# Patient Record
Sex: MALE | NOT HISPANIC OR LATINO | Employment: FULL TIME | URBAN - NONMETROPOLITAN AREA
[De-identification: names, ages, dates, MRNs, and addresses within clinical notes are randomized per-mention and may not be internally consistent; named-entity substitution may affect disease eponyms.]

---

## 2021-01-13 ENCOUNTER — IMMUNIZATION (OUTPATIENT)
Dept: VACCINE CLINIC | Facility: HOSPITAL | Age: 41
End: 2021-01-13

## 2021-01-13 PROCEDURE — 0001A: CPT | Performed by: FAMILY MEDICINE

## 2021-01-13 PROCEDURE — 91300 HC SARSCOV02 VAC 30MCG/0.3ML IM: CPT | Performed by: FAMILY MEDICINE

## 2021-02-03 ENCOUNTER — IMMUNIZATION (OUTPATIENT)
Dept: VACCINE CLINIC | Facility: HOSPITAL | Age: 41
End: 2021-02-03

## 2021-02-03 PROCEDURE — 91300 HC SARSCOV02 VAC 30MCG/0.3ML IM: CPT | Performed by: INTERNAL MEDICINE

## 2021-02-03 PROCEDURE — 0002A: CPT | Performed by: INTERNAL MEDICINE

## 2021-09-01 ENCOUNTER — APPOINTMENT (RX ONLY)
Dept: URBAN - METROPOLITAN AREA CLINIC 51 | Facility: CLINIC | Age: 41
Setting detail: DERMATOLOGY
End: 2021-09-01

## 2021-09-01 DIAGNOSIS — Z80.8 FAMILY HISTORY OF MALIGNANT NEOPLASM OF OTHER ORGANS OR SYSTEMS: ICD-10-CM

## 2021-09-01 DIAGNOSIS — D485 NEOPLASM OF UNCERTAIN BEHAVIOR OF SKIN: ICD-10-CM | Status: WORSENING

## 2021-09-01 PROBLEM — D48.5 NEOPLASM OF UNCERTAIN BEHAVIOR OF SKIN: Status: ACTIVE | Noted: 2021-09-01

## 2021-09-01 PROCEDURE — ? BIOPSY BY SHAVE METHOD

## 2021-09-01 PROCEDURE — ? FULL BODY SKIN EXAM - DECLINED

## 2021-09-01 PROCEDURE — ? COUNSELING

## 2021-09-01 PROCEDURE — 99202 OFFICE O/P NEW SF 15 MIN: CPT | Mod: 25

## 2021-09-01 PROCEDURE — 11102 TANGNTL BX SKIN SINGLE LES: CPT

## 2021-09-01 ASSESSMENT — LOCATION SIMPLE DESCRIPTION DERM: LOCATION SIMPLE: RIGHT UPPER ARM

## 2021-09-01 ASSESSMENT — LOCATION ZONE DERM: LOCATION ZONE: ARM

## 2021-09-01 ASSESSMENT — LOCATION DETAILED DESCRIPTION DERM: LOCATION DETAILED: RIGHT ANTERIOR PROXIMAL UPPER ARM

## 2021-09-01 NOTE — PROCEDURE: BIOPSY BY SHAVE METHOD
Detail Level: Detailed
Depth Of Biopsy: dermis
Was A Bandage Applied: Yes
Size Of Lesion In Cm: 1
Biopsy Type: H and E
Biopsy Method: Dermablade
Anesthesia Type: 1% lidocaine with epinephrine
Anesthesia Volume In Cc (Will Not Render If 0): 0.5
Additional Anesthesia Volume In Cc (Will Not Render If 0): 0
Hemostasis: Brittni's
Wound Care: Bacitracin
Dressing: bandage
Destruction After The Procedure: No
Type Of Destruction Used: Curettage
Curettage Text: The wound bed was treated with curettage after the biopsy was performed.
Cryotherapy Text: The wound bed was treated with cryotherapy after the biopsy was performed.
Electrodesiccation Text: The wound bed was treated with electrodesiccation after the biopsy was performed.
Electrodesiccation And Curettage Text: The wound bed was treated with electrodesiccation and curettage after the biopsy was performed.
Silver Nitrate Text: The wound bed was treated with silver nitrate after the biopsy was performed.
Lab: 6
Lab Facility: 3
Consent: Written consent was obtained and risks were reviewed including but not limited to scarring, infection, bleeding, scabbing, incomplete removal, nerve damage and allergy to anesthesia.
Post-Care Instructions: I reviewed with the patient in detail post-care instructions. Patient is to keep the biopsy site dry overnight, and then apply bacitracin twice daily until healed. Patient may apply hydrogen peroxide soaks to remove any crusting.
Notification Instructions: Patient will be notified of biopsy results. However, patient instructed to call the office if not contacted within 2 weeks.
Billing Type: Third-Party Bill
Information: Selecting Yes will display possible errors in your note based on the variables you have selected. This validation is only offered as a suggestion for you. PLEASE NOTE THAT THE VALIDATION TEXT WILL BE REMOVED WHEN YOU FINALIZE YOUR NOTE. IF YOU WANT TO FAX A PRELIMINARY NOTE YOU WILL NEED TO TOGGLE THIS TO 'NO' IF YOU DO NOT WANT IT IN YOUR FAXED NOTE.

## 2021-09-08 ENCOUNTER — APPOINTMENT (RX ONLY)
Dept: URBAN - METROPOLITAN AREA CLINIC 51 | Facility: CLINIC | Age: 41
Setting detail: DERMATOLOGY
End: 2021-09-08

## 2021-09-08 DIAGNOSIS — L81.4 OTHER MELANIN HYPERPIGMENTATION: ICD-10-CM | Status: STABLE

## 2021-09-08 DIAGNOSIS — D22 MELANOCYTIC NEVI: ICD-10-CM | Status: UNCHANGED

## 2021-09-08 PROBLEM — D22.5 MELANOCYTIC NEVI OF TRUNK: Status: ACTIVE | Noted: 2021-09-08

## 2021-09-08 PROCEDURE — ? TREATMENT REGIMEN

## 2021-09-08 PROCEDURE — 99213 OFFICE O/P EST LOW 20 MIN: CPT

## 2021-09-08 PROCEDURE — ? FULL BODY SKIN EXAM

## 2021-09-08 PROCEDURE — ? COUNSELING

## 2021-09-08 PROCEDURE — ? SUNSCREEN RECOMMENDATIONS

## 2021-09-08 ASSESSMENT — LOCATION DETAILED DESCRIPTION DERM
LOCATION DETAILED: RIGHT MEDIAL SUPERIOR CHEST
LOCATION DETAILED: EPIGASTRIC SKIN

## 2021-09-08 ASSESSMENT — LOCATION ZONE DERM: LOCATION ZONE: TRUNK

## 2021-09-08 ASSESSMENT — LOCATION SIMPLE DESCRIPTION DERM
LOCATION SIMPLE: ABDOMEN
LOCATION SIMPLE: CHEST

## 2023-10-24 ENCOUNTER — APPOINTMENT (OUTPATIENT)
Dept: CT IMAGING | Facility: HOSPITAL | Age: 43
End: 2023-10-24
Payer: MEDICAID

## 2023-10-24 ENCOUNTER — HOSPITAL ENCOUNTER (EMERGENCY)
Facility: HOSPITAL | Age: 43
Discharge: HOME | End: 2023-10-24
Attending: FAMILY MEDICINE | Admitting: FAMILY MEDICINE
Payer: MEDICAID

## 2023-10-24 VITALS
DIASTOLIC BLOOD PRESSURE: 94 MMHG | BODY MASS INDEX: 36.88 KG/M2 | WEIGHT: 235 LBS | TEMPERATURE: 98.5 F | SYSTOLIC BLOOD PRESSURE: 147 MMHG | HEIGHT: 67 IN | RESPIRATION RATE: 16 BRPM | HEART RATE: 97 BPM | OXYGEN SATURATION: 98 %

## 2023-10-24 DIAGNOSIS — N20.0 KIDNEY STONE: Primary | ICD-10-CM

## 2023-10-24 DIAGNOSIS — N13.9 OBSTRUCTIVE UROPATHY: ICD-10-CM

## 2023-10-24 LAB
ALBUMIN SERPL-MCNC: 5.3 G/DL (ref 3.5–5.2)
ALBUMIN/GLOB SERPL: 1.6 G/DL
ALP SERPL-CCNC: 102 U/L (ref 39–117)
ALT SERPL W P-5'-P-CCNC: 106 U/L (ref 1–41)
ANION GAP SERPL CALCULATED.3IONS-SCNC: 15.6 MMOL/L (ref 5–15)
AST SERPL-CCNC: 43 U/L (ref 1–40)
BACTERIA UR QL AUTO: ABNORMAL /HPF
BASOPHILS # BLD AUTO: 0.07 10*3/MM3 (ref 0–0.2)
BASOPHILS NFR BLD AUTO: 0.5 % (ref 0–1.5)
BILIRUB SERPL-MCNC: 0.7 MG/DL (ref 0–1.2)
BILIRUB UR QL STRIP: NEGATIVE
BUN SERPL-MCNC: 15 MG/DL (ref 6–20)
BUN/CREAT SERPL: 12.2 (ref 7–25)
CALCIUM SPEC-SCNC: 10.3 MG/DL (ref 8.6–10.5)
CHLORIDE SERPL-SCNC: 106 MMOL/L (ref 98–107)
CLARITY UR: CLEAR
CO2 SERPL-SCNC: 24.4 MMOL/L (ref 22–29)
COLOR UR: YELLOW
CREAT SERPL-MCNC: 1.23 MG/DL (ref 0.76–1.27)
CRP SERPL-MCNC: 0.47 MG/DL (ref 0–0.5)
DEPRECATED RDW RBC AUTO: 40 FL (ref 37–54)
EGFRCR SERPLBLD CKD-EPI 2021: 75.6 ML/MIN/1.73
EOSINOPHIL # BLD AUTO: 0.02 10*3/MM3 (ref 0–0.4)
EOSINOPHIL NFR BLD AUTO: 0.1 % (ref 0.3–6.2)
ERYTHROCYTE [DISTWIDTH] IN BLOOD BY AUTOMATED COUNT: 12.9 % (ref 12.3–15.4)
ERYTHROCYTE [SEDIMENTATION RATE] IN BLOOD: 9 MM/HR (ref 0–15)
GLOBULIN UR ELPH-MCNC: 3.4 GM/DL
GLUCOSE SERPL-MCNC: 133 MG/DL (ref 65–99)
GLUCOSE UR STRIP-MCNC: NEGATIVE MG/DL
HCT VFR BLD AUTO: 48 % (ref 37.5–51)
HGB BLD-MCNC: 16 G/DL (ref 13–17.7)
HGB UR QL STRIP.AUTO: ABNORMAL
HOLD SPECIMEN: NORMAL
HOLD SPECIMEN: NORMAL
HYALINE CASTS UR QL AUTO: ABNORMAL /LPF
IMM GRANULOCYTES # BLD AUTO: 0.04 10*3/MM3 (ref 0–0.05)
IMM GRANULOCYTES NFR BLD AUTO: 0.3 % (ref 0–0.5)
KETONES UR QL STRIP: NEGATIVE
LEUKOCYTE ESTERASE UR QL STRIP.AUTO: NEGATIVE
LYMPHOCYTES # BLD AUTO: 1.2 10*3/MM3 (ref 0.7–3.1)
LYMPHOCYTES NFR BLD AUTO: 8.6 % (ref 19.6–45.3)
MCH RBC QN AUTO: 28.6 PG (ref 26.6–33)
MCHC RBC AUTO-ENTMCNC: 33.3 G/DL (ref 31.5–35.7)
MCV RBC AUTO: 85.9 FL (ref 79–97)
MONOCYTES # BLD AUTO: 0.34 10*3/MM3 (ref 0.1–0.9)
MONOCYTES NFR BLD AUTO: 2.4 % (ref 5–12)
MUCOUS THREADS URNS QL MICRO: ABNORMAL /HPF
NEUTROPHILS NFR BLD AUTO: 12.31 10*3/MM3 (ref 1.7–7)
NEUTROPHILS NFR BLD AUTO: 88.1 % (ref 42.7–76)
NITRITE UR QL STRIP: NEGATIVE
NRBC BLD AUTO-RTO: 0 /100 WBC (ref 0–0.2)
PH UR STRIP.AUTO: 8 [PH] (ref 5–8)
PLATELET # BLD AUTO: 328 10*3/MM3 (ref 140–450)
PMV BLD AUTO: 9.8 FL (ref 6–12)
POTASSIUM SERPL-SCNC: 4.1 MMOL/L (ref 3.5–5.2)
PROT SERPL-MCNC: 8.7 G/DL (ref 6–8.5)
PROT UR QL STRIP: ABNORMAL
RBC # BLD AUTO: 5.59 10*6/MM3 (ref 4.14–5.8)
RBC # UR STRIP: ABNORMAL /HPF
REF LAB TEST METHOD: ABNORMAL
SODIUM SERPL-SCNC: 146 MMOL/L (ref 136–145)
SP GR UR STRIP: 1.02 (ref 1–1.03)
SQUAMOUS #/AREA URNS HPF: ABNORMAL /HPF
UROBILINOGEN UR QL STRIP: ABNORMAL
WBC # UR STRIP: ABNORMAL /HPF
WBC CLUMPS # UR AUTO: ABNORMAL /HPF
WBC NRBC COR # BLD: 13.98 10*3/MM3 (ref 3.4–10.8)
WHOLE BLOOD HOLD COAG: NORMAL
WHOLE BLOOD HOLD SPECIMEN: NORMAL

## 2023-10-24 PROCEDURE — 96374 THER/PROPH/DIAG INJ IV PUSH: CPT

## 2023-10-24 PROCEDURE — 81001 URINALYSIS AUTO W/SCOPE: CPT | Performed by: NURSE PRACTITIONER

## 2023-10-24 PROCEDURE — 80053 COMPREHEN METABOLIC PANEL: CPT | Performed by: NURSE PRACTITIONER

## 2023-10-24 PROCEDURE — 25010000002 ONDANSETRON PER 1 MG: Performed by: FAMILY MEDICINE

## 2023-10-24 PROCEDURE — 74176 CT ABD & PELVIS W/O CONTRAST: CPT | Performed by: RADIOLOGY

## 2023-10-24 PROCEDURE — 25810000003 LACTATED RINGERS SOLUTION: Performed by: FAMILY MEDICINE

## 2023-10-24 PROCEDURE — 25010000002 MORPHINE PER 10 MG: Performed by: FAMILY MEDICINE

## 2023-10-24 PROCEDURE — 51702 INSERT TEMP BLADDER CATH: CPT

## 2023-10-24 PROCEDURE — 85652 RBC SED RATE AUTOMATED: CPT | Performed by: NURSE PRACTITIONER

## 2023-10-24 PROCEDURE — 25010000002 HYDRALAZINE PER 20 MG: Performed by: FAMILY MEDICINE

## 2023-10-24 PROCEDURE — 36415 COLL VENOUS BLD VENIPUNCTURE: CPT

## 2023-10-24 PROCEDURE — 86140 C-REACTIVE PROTEIN: CPT | Performed by: NURSE PRACTITIONER

## 2023-10-24 PROCEDURE — 74176 CT ABD & PELVIS W/O CONTRAST: CPT

## 2023-10-24 PROCEDURE — 99284 EMERGENCY DEPT VISIT MOD MDM: CPT

## 2023-10-24 PROCEDURE — 25010000002 HYDROMORPHONE 1 MG/ML SOLUTION: Performed by: FAMILY MEDICINE

## 2023-10-24 PROCEDURE — 85025 COMPLETE CBC W/AUTO DIFF WBC: CPT | Performed by: NURSE PRACTITIONER

## 2023-10-24 PROCEDURE — 63710000001 ONDANSETRON ODT 4 MG TABLET DISPERSIBLE: Performed by: FAMILY MEDICINE

## 2023-10-24 PROCEDURE — 96375 TX/PRO/DX INJ NEW DRUG ADDON: CPT

## 2023-10-24 RX ORDER — SODIUM CHLORIDE 0.9 % (FLUSH) 0.9 %
10 SYRINGE (ML) INJECTION AS NEEDED
Status: DISCONTINUED | OUTPATIENT
Start: 2023-10-24 | End: 2023-10-25 | Stop reason: HOSPADM

## 2023-10-24 RX ORDER — CLONIDINE HYDROCHLORIDE 0.1 MG/1
0.1 TABLET ORAL ONCE
Status: DISCONTINUED | OUTPATIENT
Start: 2023-10-24 | End: 2023-10-24

## 2023-10-24 RX ORDER — ONDANSETRON 4 MG/1
4 TABLET, ORALLY DISINTEGRATING ORAL EVERY 8 HOURS PRN
Qty: 12 TABLET | Refills: 0 | Status: SHIPPED | OUTPATIENT
Start: 2023-10-24 | End: 2023-10-25 | Stop reason: SDUPTHER

## 2023-10-24 RX ORDER — TAMSULOSIN HYDROCHLORIDE 0.4 MG/1
2 CAPSULE ORAL DAILY
Qty: 10 CAPSULE | Refills: 0 | Status: SHIPPED | OUTPATIENT
Start: 2023-10-24 | End: 2023-10-25 | Stop reason: SDUPTHER

## 2023-10-24 RX ORDER — ONDANSETRON 2 MG/ML
4 INJECTION INTRAMUSCULAR; INTRAVENOUS ONCE
Status: COMPLETED | OUTPATIENT
Start: 2023-10-24 | End: 2023-10-24

## 2023-10-24 RX ORDER — HYDROCODONE BITARTRATE AND ACETAMINOPHEN 5; 325 MG/1; MG/1
1 TABLET ORAL ONCE
Status: COMPLETED | OUTPATIENT
Start: 2023-10-24 | End: 2023-10-24

## 2023-10-24 RX ORDER — HYDROCODONE BITARTRATE AND ACETAMINOPHEN 7.5; 325 MG/1; MG/1
1 TABLET ORAL EVERY 4 HOURS PRN
Qty: 20 TABLET | Refills: 0 | Status: SHIPPED | OUTPATIENT
Start: 2023-10-24

## 2023-10-24 RX ORDER — ONDANSETRON 4 MG/1
4 TABLET, ORALLY DISINTEGRATING ORAL ONCE
Status: DISCONTINUED | OUTPATIENT
Start: 2023-10-24 | End: 2023-10-24

## 2023-10-24 RX ORDER — HYDRALAZINE HYDROCHLORIDE 20 MG/ML
10 INJECTION INTRAMUSCULAR; INTRAVENOUS ONCE
Status: COMPLETED | OUTPATIENT
Start: 2023-10-24 | End: 2023-10-24

## 2023-10-24 RX ORDER — HYDROCODONE BITARTRATE AND ACETAMINOPHEN 5; 325 MG/1; MG/1
1 TABLET ORAL ONCE
Status: DISCONTINUED | OUTPATIENT
Start: 2023-10-24 | End: 2023-10-24

## 2023-10-24 RX ORDER — TAMSULOSIN HYDROCHLORIDE 0.4 MG/1
0.8 CAPSULE ORAL ONCE
Status: COMPLETED | OUTPATIENT
Start: 2023-10-24 | End: 2023-10-24

## 2023-10-24 RX ADMIN — SODIUM CHLORIDE, POTASSIUM CHLORIDE, SODIUM LACTATE AND CALCIUM CHLORIDE 1000 ML: 600; 310; 30; 20 INJECTION, SOLUTION INTRAVENOUS at 20:47

## 2023-10-24 RX ADMIN — MORPHINE SULFATE 4 MG: 4 INJECTION, SOLUTION INTRAMUSCULAR; INTRAVENOUS at 20:45

## 2023-10-24 RX ADMIN — HYDROCODONE BITARTRATE AND ACETAMINOPHEN 1 TABLET: 5; 325 TABLET ORAL at 22:13

## 2023-10-24 RX ADMIN — HYDRALAZINE HYDROCHLORIDE 10 MG: 20 INJECTION INTRAMUSCULAR; INTRAVENOUS at 20:44

## 2023-10-24 RX ADMIN — HYDROMORPHONE HYDROCHLORIDE 1 MG: 1 INJECTION, SOLUTION INTRAMUSCULAR; INTRAVENOUS; SUBCUTANEOUS at 21:03

## 2023-10-24 RX ADMIN — ONDANSETRON 4 MG: 2 INJECTION INTRAMUSCULAR; INTRAVENOUS at 20:32

## 2023-10-24 RX ADMIN — TAMSULOSIN HYDROCHLORIDE 0.8 MG: 0.4 CAPSULE ORAL at 22:13

## 2023-10-24 NOTE — ED PROVIDER NOTES
Subjective   History of Present Illness  Patient is a 42-year-old male presents the ED complaining of flank pain has been going on for the past week.  Positive for nausea.  Last void was at 10 AM.  Denies having any acute pathology with his kidneys in the past including kidney stones.    During my exam, patient seems very uncomfortable.  Work-up ordered by the PIT provider was back and I have notified the patient's results included the CT scan which noted right distal ureteral kidney stone with obstructive uropathy. RN doing bedside bladder scan. Darden ordered.       Review of Systems   Constitutional: Negative.  Negative for fatigue and fever.   HENT: Negative.  Negative for ear pain and sore throat.    Eyes: Negative.  Negative for pain and visual disturbance.   Respiratory: Negative.  Negative for cough and shortness of breath.    Cardiovascular: Negative.  Negative for chest pain and palpitations.   Gastrointestinal:  Positive for nausea. Negative for abdominal pain.   Endocrine: Negative.    Genitourinary:  Positive for decreased urine volume, difficulty urinating and flank pain. Negative for dysuria.   Musculoskeletal:  Negative for arthralgias.   Skin:  Negative for rash.   Allergic/Immunologic: Negative.    Neurological:  Negative for dizziness, weakness and headaches.   Psychiatric/Behavioral:  Negative for sleep disturbance.        No past medical history on file.    Allergies   Allergen Reactions    Penicillins Unknown - High Severity       No past surgical history on file.    No family history on file.    Social History     Socioeconomic History    Marital status:            Objective   Physical Exam  Vitals and nursing note reviewed.   Constitutional:       Appearance: He is ill-appearing.   HENT:      Head: Normocephalic and atraumatic.      Right Ear: External ear normal.      Left Ear: External ear normal.      Nose: Nose normal.      Mouth/Throat:      Mouth: Mucous membranes are moist.    Eyes:      Pupils: Pupils are equal, round, and reactive to light.   Cardiovascular:      Rate and Rhythm: Normal rate and regular rhythm.   Pulmonary:      Effort: Pulmonary effort is normal.      Breath sounds: Normal breath sounds.   Abdominal:      General: Abdomen is flat. Bowel sounds are normal.      Palpations: Abdomen is soft.   Musculoskeletal:         General: Normal range of motion.      Cervical back: Normal range of motion.   Skin:     General: Skin is warm.      Capillary Refill: Capillary refill takes less than 2 seconds.   Neurological:      Mental Status: He is alert and oriented to person, place, and time. Mental status is at baseline.   Psychiatric:         Mood and Affect: Mood normal.         Behavior: Behavior normal.         Thought Content: Thought content normal.         Judgment: Judgment normal.         Procedures           ED Course                                           Medical Decision Making  Differential diagnoses include kidney stones, acute cystitis, diverticulitis, enteritis, viral processes, colitis.    Patient is a 42-year-old male presents to the ED complaining of dysuria and unable to urinate for over 8 hours.  Physical exam shows a acutely distressed patient with suprapubic tenderness.  Work-up done in the ED shows 2.6 mm distal ureteral stone with obstructive uropathy.  After giving IV intervention and Darden, patient feels so much relieved and is not in any distress anymore and during my reassessment.  I discussed case treatment plan.  Shared decision making process with family and patient.  Patient is agreeable to go home with Darden catheter anchored to his leg until he sees urology.  He is to call them tomorrow to make an appointment for this.  All medications prescribed and advised to be taken as instructed.  He voiced understanding to all the above.  Upon being medically stable, patient was discharged home.  Advised to follow-up with PCP/urology for further care.   Warning signs to return to ED discussed with patient.  Patient's blood pressure upon discharge was 155/99.    Problems Addressed:  Kidney stone: complicated acute illness or injury  Obstructive uropathy: complicated acute illness or injury    Amount and/or Complexity of Data Reviewed  Labs: ordered. Decision-making details documented in ED Course.  Radiology: ordered. Decision-making details documented in ED Course.    Risk  Prescription drug management.        Final diagnoses:   Kidney stone   Obstructive uropathy       ED Disposition  ED Disposition       ED Disposition   Discharge    Condition   Stable    Comment   --               Britney Garza P, APRN  [address]   [address]  [place] [address]  [phone]    Schedule an appointment as soon as possible for a visit in 1 day  Close follow-up    Cedrick Harrison MD  [address]  [facility] [address]  [phone]    Schedule an appointment as soon as possible for a visit in 1 day  Close follow-up         Medication List        New Prescriptions      HYDROcodone-acetaminophen 7.5-325 MG per tablet  Commonly known as: NORCO  Take 1 tablet by mouth Every 4 (Four) Hours As Needed for Moderate Pain.     ondansetron ODT 4 MG disintegrating tablet  Commonly known as: ZOFRAN-ODT  Place 1 tablet on the tongue Every 8 (Eight) Hours As Needed for Nausea or Vomiting.     tamsulosin 0.4 MG capsule 24 hr capsule  Commonly known as: FLOMAX  Take 2 capsules by mouth Daily for 5 days.               Where to Get Your Medications        These medications were sent to [facility] - [place], [place] - [address] - [phone] [facility] [phone]   [address] [facility] [address]      Phone: [phone]   HYDROcodone-acetaminophen 7.5-325 MG per tablet  ondansetron ODT 4 MG disintegrating tablet  tamsulosin 0.4 MG capsule 24 hr capsule            Fay Carmona MD  10/24/23 9719

## 2023-10-24 NOTE — ED NOTES
MEDICAL SCREENING:    Reason for Visit: Flank Pain    Patient initially seen in triage.  The patient was advised further evaluation and diagnostic testing will be needed, some of the treatment and testing will be initiated in the lobby in order to begin the process.  The patient will be returned to the waiting area for the time being and possibly be re-assessed by a subsequent ED provider.  The patient will be brought back to the treatment area in as timely manner as possible.     Yulia Urbano, APRN  10/24/23 1726

## 2023-10-25 ENCOUNTER — OFFICE VISIT (OUTPATIENT)
Dept: UROLOGY | Facility: CLINIC | Age: 43
End: 2023-10-25
Payer: MEDICAID

## 2023-10-25 VITALS
DIASTOLIC BLOOD PRESSURE: 94 MMHG | HEIGHT: 67 IN | SYSTOLIC BLOOD PRESSURE: 146 MMHG | WEIGHT: 237.6 LBS | HEART RATE: 98 BPM | BODY MASS INDEX: 37.29 KG/M2

## 2023-10-25 DIAGNOSIS — R33.8 ACUTE URINARY RETENTION: ICD-10-CM

## 2023-10-25 DIAGNOSIS — N20.1 RIGHT URETERAL STONE: Primary | ICD-10-CM

## 2023-10-25 RX ORDER — ONDANSETRON 4 MG/1
4 TABLET, ORALLY DISINTEGRATING ORAL EVERY 8 HOURS PRN
Qty: 20 TABLET | Refills: 0 | Status: SHIPPED | OUTPATIENT
Start: 2023-10-25

## 2023-10-25 RX ORDER — TAMSULOSIN HYDROCHLORIDE 0.4 MG/1
1 CAPSULE ORAL DAILY
Qty: 30 CAPSULE | Refills: 0 | Status: SHIPPED | OUTPATIENT
Start: 2023-10-25

## 2023-10-25 NOTE — PROGRESS NOTES
"Chief Complaint:    Chief Complaint   Patient presents with    Nephrolithiasis     New patient / Er Follow up     Urinary Retention       Vital Signs:   /94   Pulse 98   Ht 170.2 cm (67\")   Wt 108 kg (237 lb 9.6 oz)   BMI 37.21 kg/m²   Body mass index is 37.21 kg/m².      HPI:  José Miguel Gonzalez is a 42 y.o. male who presents today for initial evaluation     History of Present Illness  Mr. Arenas presents to the clinic for ER follow-up.  Patient had significant right-sided back and flank pain that began roughly 2 to 3 days ago and proceed to go to the ER last night for evaluation.  He had a CT scan of the abdomen pelvis completed at that time that showed a distal right ureteral calculus measuring roughly 2 to 3 mm in size that was causing some mild obstructive uropathy.  Patient was also unable to urinate in the ER and states he had went roughly 12 hours without being able to urinate.  At time of CT his bladder was nondistended however there was some mild bladder wall thickening.  A Darden catheter was placed in the emergency department and he was discharged home with appropriate pain medications.  He presents today for evaluation.  States that pain is intermittent.  Does report some radiation into the scrotum.  Urine and collection bag is yellow in color with no signs of gross hematuria or sediments.  He does report some intermittent blood clots however states he has not had any since last night.      Past Medical History:  Past Medical History:   Diagnosis Date    Kidney stones        Current Meds:  Current Outpatient Medications   Medication Sig Dispense Refill    HYDROcodone-acetaminophen (NORCO) 7.5-325 MG per tablet Take 1 tablet by mouth Every 4 (Four) Hours As Needed for Moderate Pain. 20 tablet 0    ondansetron ODT (ZOFRAN-ODT) 4 MG disintegrating tablet Place 1 tablet on the tongue Every 8 (Eight) Hours As Needed for Nausea or Vomiting. 20 tablet 0    tamsulosin (FLOMAX) 0.4 MG capsule 24 hr capsule " Take 1 capsule by mouth Daily. 30 capsule 0     No current facility-administered medications for this visit.        Allergies:   Allergies   Allergen Reactions    Penicillins Unknown - High Severity        Past Surgical History:  History reviewed. No pertinent surgical history.    Social History:  Social History     Socioeconomic History    Marital status:    Tobacco Use    Smoking status: Never    Smokeless tobacco: Never   Vaping Use    Vaping Use: Never used   Substance and Sexual Activity    Alcohol use: Yes    Drug use: Never    Sexual activity: Defer       Family History:  Family History   Problem Relation Age of Onset    Heart disease Father     Hypertension Father     Cancer Mother     Heart disease Mother     Hypertension Mother        Review of Systems:  Review of Systems   Constitutional:  Positive for fatigue. Negative for chills, fever and unexpected weight change.   HENT:  Negative for congestion and sinus pressure.    Respiratory:  Negative for chest tightness and shortness of breath.    Cardiovascular:  Negative for chest pain.   Gastrointestinal:  Positive for abdominal pain. Negative for constipation, diarrhea, nausea and vomiting.   Genitourinary:  Positive for difficulty urinating, flank pain, hematuria and urgency. Negative for dysuria and frequency.   Musculoskeletal:  Positive for back pain. Negative for neck pain.   Skin:  Negative for rash.   Allergic/Immunologic: Negative for food allergies.   Neurological:  Positive for headaches. Negative for dizziness.   Hematological:  Does not bruise/bleed easily.   Psychiatric/Behavioral:  Negative for confusion and suicidal ideas. The patient is nervous/anxious.        Physical Exam:  Physical Exam  Constitutional:       General: He is not in acute distress.     Appearance: Normal appearance.   HENT:      Head: Normocephalic and atraumatic.      Nose: Nose normal.      Mouth/Throat:      Mouth: Mucous membranes are moist.   Eyes:       Conjunctiva/sclera: Conjunctivae normal.   Cardiovascular:      Rate and Rhythm: Normal rate and regular rhythm.      Pulses: Normal pulses.      Heart sounds: Normal heart sounds.   Pulmonary:      Effort: Pulmonary effort is normal.      Breath sounds: Normal breath sounds.   Abdominal:      General: Bowel sounds are normal.      Palpations: Abdomen is soft.   Genitourinary:     Penis: Normal.       Testes: Normal.   Musculoskeletal:         General: Normal range of motion.      Cervical back: Normal range of motion.   Skin:     General: Skin is warm.   Neurological:      General: No focal deficit present.      Mental Status: He is alert and oriented to person, place, and time.   Psychiatric:         Mood and Affect: Mood normal.         Behavior: Behavior normal.         Thought Content: Thought content normal.         Judgment: Judgment normal.         Recent Image (CT and/or KUB):   CT Abdomen and Pelvis: No results found for this or any previous visit.     CT Stone Protocol: Results for orders placed during the hospital encounter of 10/24/23    CT Abdomen Pelvis Stone Protocol    Narrative  EXAM: CT ABDOMEN PELVIS STONE PROTOCOL-      TECHNIQUE: Multiple axial CT images were obtained from lung bases  through pubic symphysis WITHOUT administration of IV contrast.  Reformatted images in the coronal and/or sagittal plane(s) were  generated from the axial data set to facilitate diagnostic accuracy  and/or surgical planning.  Oral Contrast:NONE.    Radiation dose reduction techniques were utilized per ALARA protocol.  Automated exposure control was initiated through either or [employer] or  [employer] software packages by  protocol.  DOSE:    Clinical information Flank pain, kidney stone suspected    Comparison none immediately available at this time    FINDINGS:    Lower thorax: Clear. No effusions.    Abdomen:    Liver: Homogeneous. No focal hepatic mass or ductal dilatation.    Gallbladder: No dilation  or stone identified.    Pancreas: Unremarkable. No mass or ductal dilatation.    Spleen: Homogeneous. No splenomegaly.    Adrenals: No mass.    Kidneys/ureters: Right-sided hydronephrosis and hydroureter to the level  of the 2.3 mm distal right ureteral stone    GI tract: Non-dilated. No definite wall thickening.. There is no  evidence of appendicitis    MESENTERY: No free fluid, walled off fluid collections, mesenteric  stranding, or enlarged lymph nodes      Vasculature: No evidence of aneurysm.    Abdominal wall: No focal hernia or mass.      Bladder: No focal mass or significant wall thickening    Reproductive: Unremarkable as visualized    Bones: No acute bony abnormality.    Impression  1. Obstructive uropathy on the right due to a distal right ureteral  stone    2. Other incidental findings as discussed above.                    This report was finalized on 10/24/2023 5:42 PM by Dr. Carlos Alberto Camara MD.     KUB: No results found for this or any previous visit.       Labs:  Brief Urine Lab Results  (Last result in the past 365 days)        Color   Clarity   Blood   Leuk Est   Nitrite   Protein   CREAT   Urine HCG        10/24/23 2103 Yellow   Clear   Large (3+)   Negative   Negative   30 mg/dL (1+)                 Admission on 10/24/2023, Discharged on 10/24/2023   Component Date Value Ref Range Status    Glucose 10/24/2023 133 (H)  65 - 99 mg/dL Final    BUN 10/24/2023 15  6 - 20 mg/dL Final    Creatinine 10/24/2023 1.23  0.76 - 1.27 mg/dL Final    Sodium 10/24/2023 146 (H)  136 - 145 mmol/L Final    Potassium 10/24/2023 4.1  3.5 - 5.2 mmol/L Final    Chloride 10/24/2023 106  98 - 107 mmol/L Final    CO2 10/24/2023 24.4  22.0 - 29.0 mmol/L Final    Calcium 10/24/2023 10.3  8.6 - 10.5 mg/dL Final    Total Protein 10/24/2023 8.7 (H)  6.0 - 8.5 g/dL Final    Albumin 10/24/2023 5.3 (H)  3.5 - 5.2 g/dL Final    ALT (SGPT) 10/24/2023 106 (H)  1 - 41 U/L Final    AST (SGOT) 10/24/2023 43 (H)  1 - 40 U/L Final     Alkaline Phosphatase 10/24/2023 102  39 - 117 U/L Final    Total Bilirubin 10/24/2023 0.7  0.0 - 1.2 mg/dL Final    Globulin 10/24/2023 3.4  gm/dL Final    A/G Ratio 10/24/2023 1.6  g/dL Final    BUN/Creatinine Ratio 10/24/2023 12.2  7.0 - 25.0 Final    Anion Gap 10/24/2023 15.6 (H)  5.0 - 15.0 mmol/L Final    eGFR 10/24/2023 75.6  >60.0 mL/min/1.73 Final    Color, UA 10/24/2023 Yellow  Yellow, Straw Final    Appearance, UA 10/24/2023 Clear  Clear Final    pH, UA 10/24/2023 8.0  5.0 - 8.0 Final    Specific Gravity, UA 10/24/2023 1.018  1.005 - 1.030 Final    Glucose, UA 10/24/2023 Negative  Negative Final    Ketones, UA 10/24/2023 Negative  Negative Final    Bilirubin, UA 10/24/2023 Negative  Negative Final    Blood, UA 10/24/2023 Large (3+) (A)  Negative Final    Protein, UA 10/24/2023 30 mg/dL (1+) (A)  Negative Final    Leuk Esterase, UA 10/24/2023 Negative  Negative Final    Nitrite, UA 10/24/2023 Negative  Negative Final    Urobilinogen, UA 10/24/2023 0.2 E.U./dL  0.2 - 1.0 E.U./dL Final    C-Reactive Protein 10/24/2023 0.47  0.00 - 0.50 mg/dL Final    Sed Rate 10/24/2023 9  0 - 15 mm/hr Final    WBC 10/24/2023 13.98 (H)  3.40 - 10.80 10*3/mm3 Final    RBC 10/24/2023 5.59  4.14 - 5.80 10*6/mm3 Final    Hemoglobin 10/24/2023 16.0  13.0 - 17.7 g/dL Final    Hematocrit 10/24/2023 48.0  37.5 - 51.0 % Final    MCV 10/24/2023 85.9  79.0 - 97.0 fL Final    MCH 10/24/2023 28.6  26.6 - 33.0 pg Final    MCHC 10/24/2023 33.3  31.5 - 35.7 g/dL Final    RDW 10/24/2023 12.9  12.3 - 15.4 % Final    RDW-SD 10/24/2023 40.0  37.0 - 54.0 fl Final    MPV 10/24/2023 9.8  6.0 - 12.0 fL Final    Platelets 10/24/2023 328  140 - 450 10*3/mm3 Final    Neutrophil % 10/24/2023 88.1 (H)  42.7 - 76.0 % Final    Lymphocyte % 10/24/2023 8.6 (L)  19.6 - 45.3 % Final    Monocyte % 10/24/2023 2.4 (L)  5.0 - 12.0 % Final    Eosinophil % 10/24/2023 0.1 (L)  0.3 - 6.2 % Final    Basophil % 10/24/2023 0.5  0.0 - 1.5 % Final    Immature Grans %  10/24/2023 0.3  0.0 - 0.5 % Final    Neutrophils, Absolute 10/24/2023 12.31 (H)  1.70 - 7.00 10*3/mm3 Final    Lymphocytes, Absolute 10/24/2023 1.20  0.70 - 3.10 10*3/mm3 Final    Monocytes, Absolute 10/24/2023 0.34  0.10 - 0.90 10*3/mm3 Final    Eosinophils, Absolute 10/24/2023 0.02  0.00 - 0.40 10*3/mm3 Final    Basophils, Absolute 10/24/2023 0.07  0.00 - 0.20 10*3/mm3 Final    Immature Grans, Absolute 10/24/2023 0.04  0.00 - 0.05 10*3/mm3 Final    nRBC 10/24/2023 0.0  0.0 - 0.2 /100 WBC Final    Extra Tube 10/24/2023 Hold for add-ons.   Final    Auto resulted.    Extra Tube 10/24/2023 hold for add-on   Final    Auto resulted    Extra Tube 10/24/2023 Hold for add-ons.   Final    Auto resulted.    Extra Tube 10/24/2023 Hold for add-ons.   Final    Auto resulted    RBC, UA 10/24/2023 6-10 (A)  None Seen, 0-2 /HPF Final    WBC, UA 10/24/2023 3-5 (A)  None Seen, 0-2 /HPF Final    Bacteria, UA 10/24/2023 Trace (A)  None Seen /HPF Final    Squamous Epithelial Cells, UA 10/24/2023 0-2  None Seen, 0-2 /HPF Final    Hyaline Casts, UA 10/24/2023 0-2  None Seen /LPF Final    Mucus, UA 10/24/2023 Small/1+ (A)  None Seen, Trace /HPF Final    WBC Clumps, UA 10/24/2023 Small/1+  None Seen /HPF Final    Methodology 10/24/2023 Manual Light Microscopy   Final        Procedure: None  Procedures     I have reviewed and agree with the above PMH, PSH, FMH, social history, medications, allergies, and labs.     Assessment/Plan:   Problem List Items Addressed This Visit          Genitourinary and Reproductive     Right ureteral stone - Primary    Relevant Medications    tamsulosin (FLOMAX) 0.4 MG capsule 24 hr capsule    ondansetron ODT (ZOFRAN-ODT) 4 MG disintegrating tablet     Other Visit Diagnoses       Acute urinary retention        Relevant Medications    tamsulosin (FLOMAX) 0.4 MG capsule 24 hr capsule            Health Maintenance:   Health Maintenance Due   Topic Date Due    BMI FOLLOWUP  Never done    INFLUENZA VACCINE   08/01/2023    COVID-19 Vaccine (3 - 2023-24 season) 09/01/2023    HEPATITIS C SCREENING  Never done    ANNUAL PHYSICAL  Never done        Smoking Counseling: Never smoked or use smokeless tobacco    Urine Incontinence: Patient reports that he is not currently experiencing any symptoms of urinary incontinence.    Patient was given instructions and counseling regarding his condition or for health maintenance advice. Please see specific information pulled into the AVS if appropriate.    Patient Education:   Right ureteral calculus- It was discussed with the patient the presence of a distal 2 to 3 mm mildly obstructing right ureteral stone. We discussed the various therapeutic options available including percutaneous nephrostolithotomy, ureteroscopy and extracorporeal shockwave  lithotripsy.  We discussed the risks of lithotripsy including the passage of stones leading to a 3% chance of Steinstrasse or a large string of stones in the distal ureter. In this incidence the patient was informed that a ureteroscopy is indicated for obstructing fragments.  Patient was informed of an extremely rare incidence of renal hematoma and the significance of this.  Patient was educated on percutaneous nephrostolithotomy and its use as well as the risks and benefits such as the need for postoperative hospitalization, and the risk of damage to the kidney and the remote risk of a nephrectomy.  We also discussed the use of ureteroscopy in the upper tracts and its decreased success rate to completely remove the stones likely causing stent placement leading to an additional procedure for removal.  We discussed the absolute relative indicators for intervention including the presence of sepsis and uncontrollable pain leading to need for urgent intervention.  We discussed placement of a stent if indicated and the management of the stent as well.  Patient would like to try to pass the stone voluntarily.  Advised him to continue to increase water  intake to 2 to 3 L/day.  Advised patient to alternate pain medications as needed.  Advised patient to walk regularly to help with passage of stone.  Advised patient to r return to the clinic or go to the nearest ER if symptoms worsen.  Otherwise we will see him back in 2 to 3 weeks.  Acute urinary retention -patient had difficulty urinating most likely secondary to his right distal ureteral calculus.  We will remove the patient's catheter in office and give him a voiding trial.  Vies him return to the clinic or go to the nearest ER if he is unable to void successfully.  Advised him to continue with Flomax.  Patient verbalized understanding.    Visit Diagnoses:    ICD-10-CM ICD-9-CM   1. Right ureteral stone  N20.1 592.1   2. Acute urinary retention  R33.8 788.29       Meds Ordered During Visit:  New Medications Ordered This Visit   Medications    tamsulosin (FLOMAX) 0.4 MG capsule 24 hr capsule     Sig: Take 1 capsule by mouth Daily.     Dispense:  30 capsule     Refill:  0    ondansetron ODT (ZOFRAN-ODT) 4 MG disintegrating tablet     Sig: Place 1 tablet on the tongue Every 8 (Eight) Hours As Needed for Nausea or Vomiting.     Dispense:  20 tablet     Refill:  0       Follow Up Appointment: 2 to 3 weeks  No follow-ups on file.      This document has been electronically signed by Rudy Jameson PA-C   October 26, 2023 08:44 EDT    Part of this note may be an electronic transcription/translation of spoken language to printed text using the Dragon Dictation System.

## 2023-10-26 PROBLEM — N20.1 RIGHT URETERAL STONE: Status: ACTIVE | Noted: 2023-10-26

## 2023-11-08 ENCOUNTER — OFFICE VISIT (OUTPATIENT)
Dept: UROLOGY | Facility: CLINIC | Age: 43
End: 2023-11-08
Payer: MEDICAID

## 2023-11-08 VITALS
WEIGHT: 233.4 LBS | DIASTOLIC BLOOD PRESSURE: 93 MMHG | HEART RATE: 111 BPM | BODY MASS INDEX: 36.63 KG/M2 | HEIGHT: 67 IN | SYSTOLIC BLOOD PRESSURE: 141 MMHG

## 2023-11-08 DIAGNOSIS — N20.1 RIGHT URETERAL STONE: Primary | ICD-10-CM

## 2023-11-08 PROCEDURE — 82365 CALCULUS SPECTROSCOPY: CPT

## 2023-11-08 NOTE — PROGRESS NOTES
"Chief Complaint:    Chief Complaint   Patient presents with    Nephrolithiasis     2 week fu        Vital Signs:   /93   Pulse 111   Ht 170.2 cm (67\")   Wt 106 kg (233 lb 6.4 oz)   BMI 36.56 kg/m²   Body mass index is 36.56 kg/m².      HPI:  José Miguel Gonzalez is a 42 y.o. male who presents today for follow up    History of Present Illness  Mr. Arenas presents to the clinic for follow-up for nephrolithiasis.  He was seen in the ER secondary to right-sided back and flank pain as well as urinary retention.  He had a CT scan of the abdomen pelvis completed roughly 2 weeks ago that showed a distal right UVJ calculus causing some mild obstructive uropathy.  Patient was discharged with Darden catheter in place and followed up closely with us in office.  At last office visit I did remove the patient's Darden catheter has been urinating well since.  He reports he had pain for roughly 2 to 3 days postop and then passed his kidney stone.  He brought this and sent off for analysis.  Otherwise he has no new complaints and is doing well.      Past Medical History:  Past Medical History:   Diagnosis Date    Kidney stones        Current Meds:  Current Outpatient Medications   Medication Sig Dispense Refill    HYDROcodone-acetaminophen (NORCO) 7.5-325 MG per tablet Take 1 tablet by mouth Every 4 (Four) Hours As Needed for Moderate Pain. 20 tablet 0    ondansetron ODT (ZOFRAN-ODT) 4 MG disintegrating tablet Place 1 tablet on the tongue Every 8 (Eight) Hours As Needed for Nausea or Vomiting. 20 tablet 0    tamsulosin (FLOMAX) 0.4 MG capsule 24 hr capsule Take 1 capsule by mouth Daily. 30 capsule 0     No current facility-administered medications for this visit.        Allergies:   Allergies   Allergen Reactions    Penicillins Unknown - High Severity        Past Surgical History:  No past surgical history on file.    Social History:  Social History     Socioeconomic History    Marital status:    Tobacco Use    Smoking " status: Never    Smokeless tobacco: Never   Vaping Use    Vaping Use: Never used   Substance and Sexual Activity    Alcohol use: Yes    Drug use: Never    Sexual activity: Defer       Family History:  Family History   Problem Relation Age of Onset    Heart disease Father     Hypertension Father     Cancer Mother     Heart disease Mother     Hypertension Mother        Review of Systems:  Review of Systems   Constitutional:  Negative for chills, fatigue, fever and unexpected weight change.   HENT:  Negative for congestion and sinus pressure.    Respiratory:  Negative for chest tightness and shortness of breath.    Cardiovascular:  Negative for chest pain.   Gastrointestinal:  Positive for abdominal pain. Negative for constipation, diarrhea, nausea and vomiting.   Genitourinary:  Negative for difficulty urinating, dysuria, flank pain, frequency, hematuria and urgency.   Musculoskeletal:  Negative for back pain and neck pain.   Skin:  Negative for rash.   Allergic/Immunologic: Negative for food allergies.   Neurological:  Negative for dizziness and headaches.   Hematological:  Does not bruise/bleed easily.   Psychiatric/Behavioral:  Negative for confusion and suicidal ideas. The patient is not nervous/anxious.        Physical Exam:  Physical Exam  Constitutional:       General: He is not in acute distress.     Appearance: Normal appearance.   HENT:      Head: Normocephalic and atraumatic.      Nose: Nose normal.      Mouth/Throat:      Mouth: Mucous membranes are moist.   Eyes:      Conjunctiva/sclera: Conjunctivae normal.   Cardiovascular:      Rate and Rhythm: Normal rate and regular rhythm.      Pulses: Normal pulses.      Heart sounds: Normal heart sounds.   Pulmonary:      Effort: Pulmonary effort is normal.      Breath sounds: Normal breath sounds.   Abdominal:      General: Bowel sounds are normal.      Palpations: Abdomen is soft.   Musculoskeletal:         General: Normal range of motion.      Cervical back:  Normal range of motion.   Skin:     General: Skin is warm.   Neurological:      General: No focal deficit present.      Mental Status: He is alert and oriented to person, place, and time.   Psychiatric:         Mood and Affect: Mood normal.         Behavior: Behavior normal.         Thought Content: Thought content normal.         Judgment: Judgment normal.        Recent Image (CT and/or KUB):   CT Abdomen and Pelvis: No results found for this or any previous visit.     CT Stone Protocol: Results for orders placed during the hospital encounter of 10/24/23    CT Abdomen Pelvis Stone Protocol    Narrative  EXAM: CT ABDOMEN PELVIS STONE PROTOCOL-      TECHNIQUE: Multiple axial CT images were obtained from lung bases  through pubic symphysis WITHOUT administration of IV contrast.  Reformatted images in the coronal and/or sagittal plane(s) were  generated from the axial data set to facilitate diagnostic accuracy  and/or surgical planning.  Oral Contrast:NONE.    Radiation dose reduction techniques were utilized per ALARA protocol.  Automated exposure control was initiated through either or FAMOCO or  DoseRight software packages by  protocol.  DOSE:    Clinical information Flank pain, kidney stone suspected    Comparison none immediately available at this time    FINDINGS:    Lower thorax: Clear. No effusions.    Abdomen:    Liver: Homogeneous. No focal hepatic mass or ductal dilatation.    Gallbladder: No dilation or stone identified.    Pancreas: Unremarkable. No mass or ductal dilatation.    Spleen: Homogeneous. No splenomegaly.    Adrenals: No mass.    Kidneys/ureters: Right-sided hydronephrosis and hydroureter to the level  of the 2.3 mm distal right ureteral stone    GI tract: Non-dilated. No definite wall thickening.. There is no  evidence of appendicitis    MESENTERY: No free fluid, walled off fluid collections, mesenteric  stranding, or enlarged lymph nodes      Vasculature: No evidence of  aneurysm.    Abdominal wall: No focal hernia or mass.      Bladder: No focal mass or significant wall thickening    Reproductive: Unremarkable as visualized    Bones: No acute bony abnormality.    Impression  1. Obstructive uropathy on the right due to a distal right ureteral  stone    2. Other incidental findings as discussed above.                    This report was finalized on 10/24/2023 5:42 PM by Dr. Carlos Alberto Camara MD.     KUB: No results found for this or any previous visit.       Labs:  Brief Urine Lab Results  (Last result in the past 365 days)        Color   Clarity   Blood   Leuk Est   Nitrite   Protein   CREAT   Urine HCG        10/24/23 2103 Yellow   Clear   Large (3+)   Negative   Negative   30 mg/dL (1+)                 Admission on 10/24/2023, Discharged on 10/24/2023   Component Date Value Ref Range Status    Glucose 10/24/2023 133 (H)  65 - 99 mg/dL Final    BUN 10/24/2023 15  6 - 20 mg/dL Final    Creatinine 10/24/2023 1.23  0.76 - 1.27 mg/dL Final    Sodium 10/24/2023 146 (H)  136 - 145 mmol/L Final    Potassium 10/24/2023 4.1  3.5 - 5.2 mmol/L Final    Chloride 10/24/2023 106  98 - 107 mmol/L Final    CO2 10/24/2023 24.4  22.0 - 29.0 mmol/L Final    Calcium 10/24/2023 10.3  8.6 - 10.5 mg/dL Final    Total Protein 10/24/2023 8.7 (H)  6.0 - 8.5 g/dL Final    Albumin 10/24/2023 5.3 (H)  3.5 - 5.2 g/dL Final    ALT (SGPT) 10/24/2023 106 (H)  1 - 41 U/L Final    AST (SGOT) 10/24/2023 43 (H)  1 - 40 U/L Final    Alkaline Phosphatase 10/24/2023 102  39 - 117 U/L Final    Total Bilirubin 10/24/2023 0.7  0.0 - 1.2 mg/dL Final    Globulin 10/24/2023 3.4  gm/dL Final    A/G Ratio 10/24/2023 1.6  g/dL Final    BUN/Creatinine Ratio 10/24/2023 12.2  7.0 - 25.0 Final    Anion Gap 10/24/2023 15.6 (H)  5.0 - 15.0 mmol/L Final    eGFR 10/24/2023 75.6  >60.0 mL/min/1.73 Final    Color, UA 10/24/2023 Yellow  Yellow, Straw Final    Appearance, UA 10/24/2023 Clear  Clear Final    pH, UA 10/24/2023 8.0  5.0 - 8.0 Final     Specific Gravity, UA 10/24/2023 1.018  1.005 - 1.030 Final    Glucose, UA 10/24/2023 Negative  Negative Final    Ketones, UA 10/24/2023 Negative  Negative Final    Bilirubin, UA 10/24/2023 Negative  Negative Final    Blood, UA 10/24/2023 Large (3+) (A)  Negative Final    Protein, UA 10/24/2023 30 mg/dL (1+) (A)  Negative Final    Leuk Esterase, UA 10/24/2023 Negative  Negative Final    Nitrite, UA 10/24/2023 Negative  Negative Final    Urobilinogen, UA 10/24/2023 0.2 E.U./dL  0.2 - 1.0 E.U./dL Final    C-Reactive Protein 10/24/2023 0.47  0.00 - 0.50 mg/dL Final    Sed Rate 10/24/2023 9  0 - 15 mm/hr Final    WBC 10/24/2023 13.98 (H)  3.40 - 10.80 10*3/mm3 Final    RBC 10/24/2023 5.59  4.14 - 5.80 10*6/mm3 Final    Hemoglobin 10/24/2023 16.0  13.0 - 17.7 g/dL Final    Hematocrit 10/24/2023 48.0  37.5 - 51.0 % Final    MCV 10/24/2023 85.9  79.0 - 97.0 fL Final    MCH 10/24/2023 28.6  26.6 - 33.0 pg Final    MCHC 10/24/2023 33.3  31.5 - 35.7 g/dL Final    RDW 10/24/2023 12.9  12.3 - 15.4 % Final    RDW-SD 10/24/2023 40.0  37.0 - 54.0 fl Final    MPV 10/24/2023 9.8  6.0 - 12.0 fL Final    Platelets 10/24/2023 328  140 - 450 10*3/mm3 Final    Neutrophil % 10/24/2023 88.1 (H)  42.7 - 76.0 % Final    Lymphocyte % 10/24/2023 8.6 (L)  19.6 - 45.3 % Final    Monocyte % 10/24/2023 2.4 (L)  5.0 - 12.0 % Final    Eosinophil % 10/24/2023 0.1 (L)  0.3 - 6.2 % Final    Basophil % 10/24/2023 0.5  0.0 - 1.5 % Final    Immature Grans % 10/24/2023 0.3  0.0 - 0.5 % Final    Neutrophils, Absolute 10/24/2023 12.31 (H)  1.70 - 7.00 10*3/mm3 Final    Lymphocytes, Absolute 10/24/2023 1.20  0.70 - 3.10 10*3/mm3 Final    Monocytes, Absolute 10/24/2023 0.34  0.10 - 0.90 10*3/mm3 Final    Eosinophils, Absolute 10/24/2023 0.02  0.00 - 0.40 10*3/mm3 Final    Basophils, Absolute 10/24/2023 0.07  0.00 - 0.20 10*3/mm3 Final    Immature Grans, Absolute 10/24/2023 0.04  0.00 - 0.05 10*3/mm3 Final    nRBC 10/24/2023 0.0  0.0 - 0.2 /100 WBC Final     Extra Tube 10/24/2023 Hold for add-ons.   Final    Auto resulted.    Extra Tube 10/24/2023 hold for add-on   Final    Auto resulted    Extra Tube 10/24/2023 Hold for add-ons.   Final    Auto resulted.    Extra Tube 10/24/2023 Hold for add-ons.   Final    Auto resulted    RBC, UA 10/24/2023 6-10 (A)  None Seen, 0-2 /HPF Final    WBC, UA 10/24/2023 3-5 (A)  None Seen, 0-2 /HPF Final    Bacteria, UA 10/24/2023 Trace (A)  None Seen /HPF Final    Squamous Epithelial Cells, UA 10/24/2023 0-2  None Seen, 0-2 /HPF Final    Hyaline Casts, UA 10/24/2023 0-2  None Seen /LPF Final    Mucus, UA 10/24/2023 Small/1+ (A)  None Seen, Trace /HPF Final    WBC Clumps, UA 10/24/2023 Small/1+  None Seen /HPF Final    Methodology 10/24/2023 Manual Light Microscopy   Final        Procedure: None  Procedures     I have reviewed and agree with the above PMH, PSH, FMH, social history, medications, allergies, and labs.     Assessment/Plan:   Problem List Items Addressed This Visit          Genitourinary and Reproductive     Right ureteral stone - Primary    Relevant Orders    STONE ANALYSIS - Calculus, Voided       Health Maintenance:   Health Maintenance Due   Topic Date Due    BMI FOLLOWUP  Never done    INFLUENZA VACCINE  08/01/2023    COVID-19 Vaccine (3 - 2023-24 season) 09/01/2023    HEPATITIS C SCREENING  Never done    ANNUAL PHYSICAL  Never done        Smoking Counseling: Never smoked or use smokeless tobacco    Urine Incontinence: Patient reports that he is not currently experiencing any symptoms of urinary incontinence.    Patient was given instructions and counseling regarding his condition or for health maintenance advice. Please see specific information pulled into the AVS if appropriate.    Patient Education:   Right ureteral stone/acute urinary retention -patient since catheter removal has been urinating well with no problems.  He passed his stone roughly 2 to 3 days after last office appointment.  He denies any new complaints  and is urinating well.  I will send his stone off for analysis.  I will call him with results once obtained.  Advised him to increase water intake 2 to 3 L/day.  Advised him to decrease intake of factors contributing to stone formations.  Otherwise we will see him back on an as-needed basis.    Visit Diagnoses:    ICD-10-CM ICD-9-CM   1. Right ureteral stone  N20.1 592.1       Meds Ordered During Visit:  No orders of the defined types were placed in this encounter.      Follow Up Appointment: As needed  No follow-ups on file.      This document has been electronically signed by Rudy Jameson PA-C   November 8, 2023 12:32 EST    Part of this note may be an electronic transcription/translation of spoken language to printed text using the Dragon Dictation System.

## 2023-11-16 LAB
CALCIUM OXALATE DIHYDRATE MFR STONE IR: 80 %
COLOR STONE: NORMAL
COM MFR STONE: 20 %
COMPN STONE: NORMAL
LABORATORY COMMENT REPORT: NORMAL
Lab: NORMAL
Lab: NORMAL
PHOTO: NORMAL
SIZE STONE: NORMAL MM
SPEC SOURCE SUBJ: NORMAL
WT STONE: 13 MG

## 2023-11-17 ENCOUNTER — TELEPHONE (OUTPATIENT)
Dept: UROLOGY | Facility: CLINIC | Age: 43
End: 2023-11-17
Payer: MEDICAID

## 2023-11-17 NOTE — TELEPHONE ENCOUNTER
Called patient to discuss stone analysis results.  He did not answer so left a voicemail advising stone was calcium based.  Discussed ways to help prevent calcium based stone formation.

## 2024-12-23 ENCOUNTER — APPOINTMENT (OUTPATIENT)
Dept: URBAN - METROPOLITAN AREA CLINIC 51 | Facility: CLINIC | Age: 44
Setting detail: DERMATOLOGY
End: 2024-12-23

## 2024-12-23 DIAGNOSIS — L81.4 OTHER MELANIN HYPERPIGMENTATION: ICD-10-CM | Status: STABLE

## 2024-12-23 DIAGNOSIS — Z71.89 OTHER SPECIFIED COUNSELING: ICD-10-CM

## 2024-12-23 DIAGNOSIS — L82.1 OTHER SEBORRHEIC KERATOSIS: ICD-10-CM | Status: STABLE

## 2024-12-23 DIAGNOSIS — L57.0 ACTINIC KERATOSIS: ICD-10-CM

## 2024-12-23 DIAGNOSIS — D22 MELANOCYTIC NEVI: ICD-10-CM | Status: STABLE

## 2024-12-23 PROBLEM — D22.5 MELANOCYTIC NEVI OF TRUNK: Status: ACTIVE | Noted: 2024-12-23

## 2024-12-23 PROCEDURE — ? FULL BODY SKIN EXAM

## 2024-12-23 PROCEDURE — ? EDUCATIONAL RESOURCES PROVIDED

## 2024-12-23 PROCEDURE — ? SUNSCREEN RECOMMENDATIONS

## 2024-12-23 PROCEDURE — ? PRESCRIPTION

## 2024-12-23 PROCEDURE — ? COUNSELING

## 2024-12-23 PROCEDURE — ? TREATMENT REGIMEN

## 2024-12-23 PROCEDURE — 99203 OFFICE O/P NEW LOW 30 MIN: CPT

## 2024-12-23 RX ORDER — PHARMACY COMPOUNDING ACCESSORY
EACH MISCELLANEOUS
Qty: 30 | Refills: 1 | Status: ERX | COMMUNITY
Start: 2024-12-23

## 2024-12-23 RX ADMIN — Medication: at 00:00

## 2024-12-23 ASSESSMENT — LOCATION SIMPLE DESCRIPTION DERM
LOCATION SIMPLE: RIGHT UPPER ARM
LOCATION SIMPLE: LEFT THIGH
LOCATION SIMPLE: UPPER BACK
LOCATION SIMPLE: LEFT UPPER ARM
LOCATION SIMPLE: LEFT LOWER BACK
LOCATION SIMPLE: LEFT CHEEK
LOCATION SIMPLE: CHEST
LOCATION SIMPLE: RIGHT THIGH

## 2024-12-23 ASSESSMENT — LOCATION ZONE DERM
LOCATION ZONE: LEG
LOCATION ZONE: ARM
LOCATION ZONE: TRUNK
LOCATION ZONE: FACE

## 2024-12-23 ASSESSMENT — LOCATION DETAILED DESCRIPTION DERM
LOCATION DETAILED: RIGHT ANTERIOR DISTAL UPPER ARM
LOCATION DETAILED: SUPERIOR THORACIC SPINE
LOCATION DETAILED: LEFT INFERIOR MEDIAL MIDBACK
LOCATION DETAILED: RIGHT ANTERIOR PROXIMAL THIGH
LOCATION DETAILED: LEFT ANTERIOR DISTAL THIGH
LOCATION DETAILED: LEFT ANTERIOR DISTAL UPPER ARM
LOCATION DETAILED: LEFT INFERIOR CENTRAL MALAR CHEEK
LOCATION DETAILED: RIGHT MEDIAL SUPERIOR CHEST